# Patient Record
Sex: MALE | Race: BLACK OR AFRICAN AMERICAN | Employment: UNEMPLOYED | URBAN - METROPOLITAN AREA
[De-identification: names, ages, dates, MRNs, and addresses within clinical notes are randomized per-mention and may not be internally consistent; named-entity substitution may affect disease eponyms.]

---

## 2018-09-04 ENCOUNTER — HOSPITAL ENCOUNTER (INPATIENT)
Age: 33
LOS: 1 days | Discharge: HOME | DRG: 881 | End: 2018-09-04
Attending: EMERGENCY MEDICINE | Admitting: PSYCHIATRY & NEUROLOGY

## 2018-09-04 VITALS
WEIGHT: 170 LBS | RESPIRATION RATE: 18 BRPM | OXYGEN SATURATION: 100 % | DIASTOLIC BLOOD PRESSURE: 80 MMHG | HEART RATE: 60 BPM | HEIGHT: 68 IN | BODY MASS INDEX: 25.76 KG/M2 | SYSTOLIC BLOOD PRESSURE: 120 MMHG | TEMPERATURE: 97.8 F

## 2018-09-04 DIAGNOSIS — F32.2 CURRENT SEVERE EPISODE OF MAJOR DEPRESSIVE DISORDER WITHOUT PSYCHOTIC FEATURES WITHOUT PRIOR EPISODE (HCC): ICD-10-CM

## 2018-09-04 DIAGNOSIS — R45.851 SUICIDAL IDEATION: Primary | ICD-10-CM

## 2018-09-04 PROBLEM — F19.10 POLYSUBSTANCE ABUSE (HCC): Status: ACTIVE | Noted: 2018-09-04

## 2018-09-04 PROBLEM — F43.21 ADJUSTMENT DISORDER WITH DEPRESSED MOOD: Status: ACTIVE | Noted: 2018-09-04

## 2018-09-04 PROBLEM — Z76.5 MALINGERING: Status: ACTIVE | Noted: 2018-09-04

## 2018-09-04 LAB
AMPHET UR QL SCN: POSITIVE
ANION GAP SERPL CALC-SCNC: 4 MMOL/L (ref 5–15)
APPEARANCE UR: CLEAR
BACTERIA URNS QL MICRO: NEGATIVE /HPF
BARBITURATES UR QL SCN: NEGATIVE
BASOPHILS # BLD: 0 K/UL (ref 0–0.1)
BASOPHILS NFR BLD: 0 % (ref 0–1)
BENZODIAZ UR QL: NEGATIVE
BILIRUB UR QL CFM: NEGATIVE
BUN SERPL-MCNC: 14 MG/DL (ref 6–20)
BUN/CREAT SERPL: 11 (ref 12–20)
CALCIUM SERPL-MCNC: 8.6 MG/DL (ref 8.5–10.1)
CANNABINOIDS UR QL SCN: NEGATIVE
CHLORIDE SERPL-SCNC: 104 MMOL/L (ref 97–108)
CO2 SERPL-SCNC: 29 MMOL/L (ref 21–32)
COCAINE UR QL SCN: POSITIVE
COLOR UR: ABNORMAL
CREAT SERPL-MCNC: 1.27 MG/DL (ref 0.7–1.3)
DIFFERENTIAL METHOD BLD: NORMAL
DRUG SCRN COMMENT,DRGCM: ABNORMAL
EOSINOPHIL # BLD: 0.1 K/UL (ref 0–0.4)
EOSINOPHIL NFR BLD: 2 % (ref 0–7)
EPITH CASTS URNS QL MICRO: ABNORMAL /LPF
ERYTHROCYTE [DISTWIDTH] IN BLOOD BY AUTOMATED COUNT: 13.3 % (ref 11.5–14.5)
ETHANOL SERPL-MCNC: <10 MG/DL
GLUCOSE SERPL-MCNC: 94 MG/DL (ref 65–100)
GLUCOSE UR STRIP.AUTO-MCNC: NEGATIVE MG/DL
HCT VFR BLD AUTO: 39.8 % (ref 36.6–50.3)
HGB BLD-MCNC: 13.2 G/DL (ref 12.1–17)
HGB UR QL STRIP: NEGATIVE
IMM GRANULOCYTES # BLD: 0 K/UL (ref 0–0.04)
IMM GRANULOCYTES NFR BLD AUTO: 0 % (ref 0–0.5)
KETONES UR QL STRIP.AUTO: NEGATIVE MG/DL
LEUKOCYTE ESTERASE UR QL STRIP.AUTO: NEGATIVE
LYMPHOCYTES # BLD: 2.9 K/UL (ref 0.8–3.5)
LYMPHOCYTES NFR BLD: 43 % (ref 12–49)
MCH RBC QN AUTO: 28.5 PG (ref 26–34)
MCHC RBC AUTO-ENTMCNC: 33.2 G/DL (ref 30–36.5)
MCV RBC AUTO: 86 FL (ref 80–99)
METHADONE UR QL: NEGATIVE
MONOCYTES # BLD: 0.4 K/UL (ref 0–1)
MONOCYTES NFR BLD: 7 % (ref 5–13)
NEUTS SEG # BLD: 3.2 K/UL (ref 1.8–8)
NEUTS SEG NFR BLD: 48 % (ref 32–75)
NITRITE UR QL STRIP.AUTO: NEGATIVE
NRBC # BLD: 0 K/UL (ref 0–0.01)
NRBC BLD-RTO: 0 PER 100 WBC
OPIATES UR QL: POSITIVE
PCP UR QL: NEGATIVE
PH UR STRIP: 5.5 [PH] (ref 5–8)
PLATELET # BLD AUTO: 221 K/UL (ref 150–400)
PMV BLD AUTO: 9.7 FL (ref 8.9–12.9)
POTASSIUM SERPL-SCNC: 3.4 MMOL/L (ref 3.5–5.1)
PROT UR STRIP-MCNC: 30 MG/DL
RBC # BLD AUTO: 4.63 M/UL (ref 4.1–5.7)
RBC #/AREA URNS HPF: ABNORMAL /HPF (ref 0–5)
SODIUM SERPL-SCNC: 137 MMOL/L (ref 136–145)
SP GR UR REFRACTOMETRY: >1.03 (ref 1–1.03)
UA: UC IF INDICATED,UAUC: ABNORMAL
UROBILINOGEN UR QL STRIP.AUTO: 1 EU/DL (ref 0.2–1)
WBC # BLD AUTO: 6.7 K/UL (ref 4.1–11.1)
WBC URNS QL MICRO: ABNORMAL /HPF (ref 0–4)

## 2018-09-04 PROCEDURE — 80307 DRUG TEST PRSMV CHEM ANLYZR: CPT | Performed by: EMERGENCY MEDICINE

## 2018-09-04 PROCEDURE — 85025 COMPLETE CBC W/AUTO DIFF WBC: CPT | Performed by: EMERGENCY MEDICINE

## 2018-09-04 PROCEDURE — 80048 BASIC METABOLIC PNL TOTAL CA: CPT | Performed by: EMERGENCY MEDICINE

## 2018-09-04 PROCEDURE — 36415 COLL VENOUS BLD VENIPUNCTURE: CPT | Performed by: EMERGENCY MEDICINE

## 2018-09-04 PROCEDURE — 81001 URINALYSIS AUTO W/SCOPE: CPT | Performed by: EMERGENCY MEDICINE

## 2018-09-04 PROCEDURE — 90791 PSYCH DIAGNOSTIC EVALUATION: CPT

## 2018-09-04 PROCEDURE — 99285 EMERGENCY DEPT VISIT HI MDM: CPT

## 2018-09-04 PROCEDURE — 65220000003 HC RM SEMIPRIVATE PSYCH

## 2018-09-04 RX ORDER — BENZTROPINE MESYLATE 1 MG/ML
2 INJECTION INTRAMUSCULAR; INTRAVENOUS
Status: DISCONTINUED | OUTPATIENT
Start: 2018-09-04 | End: 2018-09-04 | Stop reason: HOSPADM

## 2018-09-04 RX ORDER — IBUPROFEN 400 MG/1
400 TABLET ORAL
Status: DISCONTINUED | OUTPATIENT
Start: 2018-09-04 | End: 2018-09-04 | Stop reason: HOSPADM

## 2018-09-04 RX ORDER — LORAZEPAM 1 MG/1
1 TABLET ORAL
Status: DISCONTINUED | OUTPATIENT
Start: 2018-09-04 | End: 2018-09-04 | Stop reason: HOSPADM

## 2018-09-04 RX ORDER — ADHESIVE BANDAGE
30 BANDAGE TOPICAL DAILY PRN
Status: DISCONTINUED | OUTPATIENT
Start: 2018-09-04 | End: 2018-09-04 | Stop reason: HOSPADM

## 2018-09-04 RX ORDER — ACETAMINOPHEN 325 MG/1
650 TABLET ORAL
Status: DISCONTINUED | OUTPATIENT
Start: 2018-09-04 | End: 2018-09-04 | Stop reason: HOSPADM

## 2018-09-04 RX ORDER — LORAZEPAM 2 MG/ML
2 INJECTION INTRAMUSCULAR
Status: DISCONTINUED | OUTPATIENT
Start: 2018-09-04 | End: 2018-09-04 | Stop reason: HOSPADM

## 2018-09-04 RX ORDER — OLANZAPINE 5 MG/1
5 TABLET ORAL
Status: DISCONTINUED | OUTPATIENT
Start: 2018-09-04 | End: 2018-09-04 | Stop reason: HOSPADM

## 2018-09-04 RX ORDER — BENZTROPINE MESYLATE 2 MG/1
2 TABLET ORAL
Status: DISCONTINUED | OUTPATIENT
Start: 2018-09-04 | End: 2018-09-04 | Stop reason: HOSPADM

## 2018-09-04 RX ORDER — IBUPROFEN 200 MG
1 TABLET ORAL
Status: DISCONTINUED | OUTPATIENT
Start: 2018-09-04 | End: 2018-09-04 | Stop reason: HOSPADM

## 2018-09-04 RX ORDER — ZOLPIDEM TARTRATE 10 MG/1
10 TABLET ORAL
Status: DISCONTINUED | OUTPATIENT
Start: 2018-09-04 | End: 2018-09-04 | Stop reason: HOSPADM

## 2018-09-04 NOTE — DISCHARGE INSTRUCTIONS
DISCHARGE SUMMARY from Nurse    PATIENT INSTRUCTIONS:    What to do at Home:    Recommended activity: Activity as tolerated,     *  Please give a list of your current medications to your Primary Care Provider. *  Please update this list whenever your medications are discontinued, doses are      changed, or new medications (including over-the-counter products) are added. *  Please carry medication information at all times in case of emergency situations. These are general instructions for a healthy lifestyle:    No smoking/ No tobacco products/ Avoid exposure to second hand smoke  Surgeon General's Warning:  Quitting smoking now greatly reduces serious risk to your health. Obesity, smoking, and sedentary lifestyle greatly increases your risk for illness    A healthy diet, regular physical exercise & weight monitoring are important for maintaining a healthy lifestyle    You may be retaining fluid if you have a history of heart failure or if you experience any of the following symptoms:  Weight gain of 3 pounds or more overnight or 5 pounds in a week, increased swelling in our hands or feet or shortness of breath while lying flat in bed. Please call your doctor as soon as you notice any of these symptoms; do not wait until your next office visit. Recognize signs and symptoms of STROKE:    F-face looks uneven    A-arms unable to move or move unevenly    S-speech slurred or non-existent    T-time-call 911 as soon as signs and symptoms begin-DO NOT go       Back to bed or wait to see if you get better-TIME IS BRAIN. Warning Signs of HEART ATTACK     Call 911 if you have these symptoms:   Chest discomfort. Most heart attacks involve discomfort in the center of the chest that lasts more than a few minutes, or that goes away and comes back. It can feel like uncomfortable pressure, squeezing, fullness, or pain.  Discomfort in other areas of the upper body.  Symptoms can include pain or discomfort in one or both arms, the back, neck, jaw, or stomach.  Shortness of breath with or without chest discomfort.  Other signs may include breaking out in a cold sweat, nausea, or lightheadedness. Don't wait more than five minutes to call 911 - MINUTES MATTER! Fast action can save your life. Calling 911 is almost always the fastest way to get lifesaving treatment. Emergency Medical Services staff can begin treatment when they arrive -- up to an hour sooner than if someone gets to the hospital by car. The discharge information has been reviewed with the patient. The patient verbalized understanding. Discharge medications reviewed with the patient and appropriate educational materials and side effects teaching were provided. If I feel that I can not keep these promises and I am at risk of hurting myself or others,I will call the crisis office and speak with a crisis worker who will assist me during my crisis.     [facility] [phone]  [facility] [phone]  Darron Clinton [phone]___________________________________________________________________________________________________________________________________

## 2018-09-04 NOTE — BH NOTES
ADMISSION NOTE: 
 
PT is a 34 yo male voluntary admitted to the services of Dr. Cynthia Zazueta. He presents with depression and SI/HI . Pt recently out of custodial, no support . He has h/o polysubstance abuse. Pt UDS+ cocaine and opiates \"BAL <10. On admission is present sleepy and slow responding to questions, barely cooperative. He denied SI/HI on admitt to the unit and contracts for safety. His skin assessment showed tattoos both arms and old scars on the left inner knee. (Fadi Michele)  Dr. Nura Henning called with report. He gave [facility] protocol with COW. Pt placed on q15 min checks for safety

## 2018-09-04 NOTE — ED NOTES
Pt presents ambulatory to ED complaining of suicidal thoughts. .Pt states I just don't want to live anymore. Pt states his finace is incarcerated and he doesn't have a support system. Pt states HI towards a family member. Pt is alert and oriented x 4, RR even and unlabored, skin is warm and dry. Assesment completed and pt updated on plan of care. Emergency Department Nursing Plan of Care The Nursing Plan of Care is developed from the Nursing assessment and Emergency Department Attending provider initial evaluation. The plan of care may be reviewed in the ED Provider note. The Plan of Care was developed with the following considerations:  
Patient / Family readiness to learn indicated by:verbalized understanding Persons(s) to be included in education: patient Barriers to Learning/Limitations:No 
 
Signed Porfirio Smith [facility]   
9/4/2018   3:41 AM

## 2018-09-04 NOTE — BH NOTES
PSYCHOSOCIAL ASSESSMENT 
:Patient identifying info: 
Marcio Paz is a 35 y.o., male admitted 9/4/2018  3:04 AM  
 
Presenting problem and precipitating factors: Pt arrived in ED with chief complaint being suicidal ideation but with no plan. However, pt stated, \"Life is better off without me, I'm better off dead. \"([facility]/Grey Ndiaye) Per [facility] note, no previous reports of suicide attempts. Pt reported he wants help but feels unstable. Pt presented as fatigued and shared he is homeless, no primary support, having financial issues and lack of primary support. Pt released from residential on first degree murder charge in June when he went to long term house but after three days he left and returned to [facility] [address]. Pt shared he is struggling with transitioning from residential. Pt reported blacking out \"sometimes\" and not knowing how he where he was and shared this has not happened in relation to any event and/or emotion Mental status assessment: Social Work-Pt refused to get out of bed thus seen in room by treatment team. Pt refused to engage after several attempts of trying to discuss treatment. Pt presents as malingering thus pt will be discharged today. Pt is alert and oriented after notified of discharge. Pt's mood once awake euthymic. Pt's thought process presented at discharge as within normal limits. Pt is to follow-up with [facility] to secure out patient psychiatry/case management/outpatient therapy if needed. Pt provided with housing crisis hotline number for shelter and [facility] information that provides felons with case management services. Pt is in agreement with the plan. [facility] care plan will be faxed electronically via [address] [facility] to [facility]#[phone]. Current psychiatric providers and contact info: Pt reported no current psychiatric providers at this time.   
 
Previous psychiatric services/providers and response to treatment: Pt reported to have been a part of [facility] in past but reported staying only through first stage before leaving program. Pt shared one psychiatric admission \"years ago\" but cannot remember as to why he was admitted. Family history of mental illness : Unknown at this time. Substance abuse history:  Pt has history of polysubstance abuse. UDS (+): Cocaine, Opiates and Amphetamines Social History Substance Use Topics  Smoking status: Current Every Day Smoker  Smokeless tobacco: Not on file  Alcohol use No  
 
 
Family constellation: Pt reported to have two children ages 3 and 13. Is significant other involved? Pt reported to be single. Describe support system: Pt reported to have no primary support system. Describe living arrangements and home environment: Pt reported to be homeless. Health issues: Please refer to H&P Hospital Problems  Never Reviewed Codes Class Noted POA * (Principal)Adjustment disorder with depressed mood ICD-10-CM: F43.21 ICD-9-CM: 309.0  2018 Yes Malingering ICD-10-CM: Z76.5 ICD-9-CM: V65.2  2018 Yes Polysubstance abuse ICD-10-CM: F19.10 ICD-9-CM: 305.90  2018 Yes Trauma history: Per [employer] report, pt has no trauma history and has not been the victim of sexual/physical abuse. Legal issues: Pt reported having no current pending legal issues but P&P status is unknown. Pt has history of being in [facility] for first degree murder and was released 2018. History of  service: Pt has no history of  service. Financial status: Pt has no means of finances. Anabaptism/cultural factors: Pt reported no Holiness/cultural factors at this time. Education/work history: Pt's highest grade achieved 6th with [employer] obtained later in years.  
 
Have you been licensed as a stephanie care professional (current or ): Pt not licensed as a health care professional. 
 
 Leisure and recreation preferences: Unknown at this time. Describe coping skills: Pt's coping skills present as ineffectual. 
 
Destinee Cornelius 9/4/2018

## 2018-09-04 NOTE — ED NOTES
TRANSFER - OUT REPORT: 
 
Verbal report given to FAYE Brown RN(name) on Chapo Edmondson  being transferred to [address] [place] [address] ([facility]) for routine progression of care Report consisted of patients Situation, Background, Assessment and  
Recommendations(SBAR). Information from the following report(s) SBAR, ED Summary and Recent Results was reviewed with the receiving nurse. Lines:    
 
Opportunity for questions and clarification was provided. Patient transported with: 
 [facility]

## 2018-09-04 NOTE — BH NOTES
Behavioral Health Transition Record to Provider Patient Name: Miladis Salinas YOB: 1985 Medical Record Number: [id number] Date of Admission: 9/4/2018 Date of Discharge: 9/4/2018 Attending Provider: Ailyn Allison MD 
Discharging Provider: Ailyn Allison MD 
To contact this individual call [phone] and ask the  to page. If unavailable, ask to be transferred to [facility] Provider on call. [facility] ER Provider will be available on call 24/7 and during holidays. Primary Care Provider: None No Known Allergies Reason for Admission: Pt admitted voluntary to ED with chief complaint being SI. Pt reporting list of psychosocial risk factors being: homelessness, polysubstance abuse, difficulty transitioning from alf, financial problems and poor primary support. Admission Diagnosis: Adjustment disorder with depressed mood * No surgery found * Results for orders placed or performed during the hospital encounter of 09/04/18 CBC WITH AUTOMATED DIFF Result Value Ref Range WBC 6.7 4.1 - 11.1 K/uL  
 RBC 4.63 4.10 - 5.70 M/uL  
 HGB 13.2 12.1 - 17.0 g/dL HCT 39.8 36.6 - 50.3 % MCV 86.0 80.0 - 99.0 FL  
 MCH 28.5 26.0 - 34.0 PG  
 MCHC 33.2 30.0 - 36.5 g/dL  
 RDW 13.3 11.5 - 14.5 % PLATELET 072 286 - 684 K/uL MPV 9.7 8.9 - 12.9 FL  
 NRBC 0.0 0  WBC ABSOLUTE NRBC 0.00 0.00 - 0.01 K/uL NEUTROPHILS 48 32 - 75 % LYMPHOCYTES 43 12 - 49 % MONOCYTES 7 5 - 13 % EOSINOPHILS 2 0 - 7 % BASOPHILS 0 0 - 1 % IMMATURE GRANULOCYTES 0 0.0 - 0.5 % ABS. NEUTROPHILS 3.2 1.8 - 8.0 K/UL  
 ABS. LYMPHOCYTES 2.9 0.8 - 3.5 K/UL  
 ABS. MONOCYTES 0.4 0.0 - 1.0 K/UL  
 ABS. EOSINOPHILS 0.1 0.0 - 0.4 K/UL  
 ABS. BASOPHILS 0.0 0.0 - 0.1 K/UL  
 ABS. IMM. GRANS. 0.0 0.00 - 0.04 K/UL  
 DF AUTOMATED METABOLIC PANEL, BASIC Result Value Ref Range Sodium 137 136 - 145 mmol/L  Potassium 3.4 (L) 3.5 - 5.1 mmol/L  
 Chloride 104 97 - 108 mmol/L  
 CO2 29 21 - 32 mmol/L Anion gap 4 (L) 5 - 15 mmol/L Glucose 94 65 - 100 mg/dL BUN 14 6 - 20 MG/DL Creatinine 1.27 0.70 - 1.30 MG/DL  
 BUN/Creatinine ratio 11 (L) 12 - 20 GFR est AA >60 >60 ml/min/1.73m2 GFR est non-AA >60 >60 ml/min/1.73m2 Calcium 8.6 8.5 - 10.1 MG/DL URINALYSIS W/ REFLEX CULTURE Result Value Ref Range Color DARK YELLOW Appearance CLEAR CLEAR Specific gravity >1.030 (H) 1.003 - 1.030  
 pH (UA) 5.5 5.0 - 8.0 Protein 30 (A) NEG mg/dL Glucose NEGATIVE  NEG mg/dL Ketone NEGATIVE  NEG mg/dL Blood NEGATIVE  NEG Urobilinogen 1.0 0.2 - 1.0 EU/dL Nitrites NEGATIVE  NEG Leukocyte Esterase NEGATIVE  NEG    
 WBC 0-4 0 - 4 /hpf  
 RBC 0-5 0 - 5 /hpf Epithelial cells FEW FEW /lpf Bacteria NEGATIVE  NEG /hpf  
 UA:UC IF INDICATED CULTURE NOT INDICATED BY UA RESULT CNI    
DRUG SCREEN, URINE Result Value Ref Range AMPHETAMINES POSITIVE (A) NEG    
 BARBITURATES NEGATIVE  NEG BENZODIAZEPINES NEGATIVE  NEG    
 COCAINE POSITIVE (A) NEG METHADONE NEGATIVE  NEG    
 OPIATES POSITIVE (A) NEG    
 PCP(PHENCYCLIDINE) NEGATIVE  NEG    
 THC (TH-CANNABINOL) NEGATIVE  NEG Drug screen comment (NOTE) ETHYL ALCOHOL Result Value Ref Range ALCOHOL(ETHYL),SERUM <10 <10 MG/DL  
BILIRUBIN, CONFIRM Result Value Ref Range Bilirubin UA, confirm NEGATIVE  NEG Immunizations administered during this encounter: There is no immunization history on file for this patient. Screening for Metabolic Disorders for Patients on Antipsychotic Medications 
(Data obtained from the EMR) Estimated Body Mass Index Estimated body mass index is 25.85 kg/(m^2) as calculated from the following: 
  Height as of this encounter: 5' 8\" (1.727 m). Weight as of this encounter: 77.1 kg (170 lb). Vital Signs/Blood Pressure Visit Vitals  /80  Pulse 60  Temp 97.8 °F (36.6 °C)  Resp 18  Ht 5' 8\" (1.727 m)  Wt 77.1 kg (170 lb)  SpO2 100%  BMI 25.85 kg/m2 Blood Glucose/Hemoglobin A1c Lab Results Component Value Date/Time Glucose 94 09/04/2018 03:30 AM  
 
 
No results found for: HBA1C, HGBE8, MLE4VJLL Lipid Panel No results found for: CHOL, CHOLX, CHLST, [address], [id number], HDL, LDL, LDLC, DLDLP, TGLX, TRIGL, TRIGP, CHHD, CHHDX Discharge Diagnosis: Please refer to psychiatrist's note. Discharge Plan: Pt seen in room in which he refused to engage in treatment with psychiatrist. Pt presents as malingering and choosing not to participate in his treatment. Pt provided bus ticket for transportation. Pt to be discharged with follow-up to [facility] for outpatient case management/SA services, [facility] referral for case management (housing, support, work, etc.), [address] provided to pt and [facility] NA meeting list for recommended sobriety. Pt is in agreement with the plan. [facility] care plan will be faxed electronically via [address] [facility] to [facility]#[phone] Discharge Medication List and Instructions: There are no discharge medications for this patient. Unresulted Labs None To obtain results of studies pending at discharge, please contact [phone] Follow-up Information Follow up With Details Comments Contact Info None   None ([phone]) Patient stated that they have no PCP Advanced Directive:  
Does the patient have an appointed surrogate decision maker? No 
Does the patient have a Medical Advance Directive? No 
Does the patient have a Psychiatric Advance Directive? No 
If the patient does not have a surrogate or Medical Advance Directive AND Psychiatric Advance Directive, the patient was offered information on these advance directives Patient will complete at a later time Patient Instructions: Please continue all medications until otherwise directed by physician. Tobacco Cessation Discharge Plan:  
Is the patient a smoker and needs referral for smoking cessation? Not applicable Patient referred to the following for smoking cessation with an appointment? Not applicable Patient was offered medication to assist with smoking cessation at discharge? Not applicable Was education for smoking cessation added to the discharge instructions? Not applicable Alcohol/Substance Abuse Discharge Plan:  
Does the patient have a history of substance/alcohol abuse and requires a referral for treatment? Yes Patient referred to the following for substance/alcohol abuse treatment with an appointment? No 
Patient was offered medication to assist with alcohol cessation at discharge? No 
Was education for substance/alcohol abuse added to discharge instructions? No 
 
Patient discharged to Home; provided to the patient/caregiver either in hard copy or electronically.

## 2018-09-04 NOTE — IP AVS SNAPSHOT
[facility] 
 
 
 [address] Patient: Thea Molina MRN: [id number] EET:5/15/7075 A check yamilet indicates which time of day the medication should be taken. My Medications Notice You have not been prescribed any medications.

## 2018-09-04 NOTE — ED PROVIDER NOTES
Patient is a 35 y.o. male presenting with mental health disorder. The history is provided by the patient. Mental Health Problem This is a new problem. Associated symptoms include agitation. Pertinent negatives include no seizures and no numbness. Mental status baseline is normal.  Risk factors include alcohol intake and the patient not taking meds correctly (recent release from [facility]). No past medical history on file. No past surgical history on file. No family history on file. Social History Social History  Marital status: N/A Spouse name: N/A  
 Number of children: N/A  
 Years of education: N/A Occupational History  Not on file. Social History Main Topics  Smoking status: Not on file  Smokeless tobacco: Not on file  Alcohol use Not on file  Drug use: Not on file  Sexual activity: Not on file Other Topics Concern  Not on file Social History Narrative ALLERGIES: Review of patient's allergies indicates no known allergies. Review of Systems Constitutional: Negative for activity change, appetite change and fever. HENT: Negative for congestion. Eyes: Negative for pain. Respiratory: Negative for apnea, cough, shortness of breath and wheezing. Cardiovascular: Negative for chest pain. Endocrine: Negative for polyuria. Genitourinary: Negative for dysuria, frequency, hematuria, penile pain and penile swelling. Allergic/Immunologic: Negative for food allergies and immunocompromised state. Neurological: Negative for dizziness, seizures, syncope, facial asymmetry, speech difficulty, light-headedness, numbness and headaches. Psychiatric/Behavioral: Positive for agitation. Vitals:  
 09/04/18 0316 BP: 120/78 Pulse: 71 Resp: 18 Temp: 97.8 °F (36.6 °C) SpO2: 100% Weight: 77.1 kg (170 lb) Height: 5' 8\" (1.727 m) Physical Exam  
Constitutional: He is oriented to person, place, and time.  He appears well-developed and well-nourished. Cardiovascular: Normal rate and regular rhythm. Exam reveals no gallop and no friction rub. No murmur heard. Abdominal: Soft. Bowel sounds are normal. He exhibits no distension. There is no tenderness. There is no rebound and no guarding. Musculoskeletal: He exhibits no edema, tenderness or deformity. Neurological: He is alert and oriented to person, place, and time. He has normal reflexes. No cranial nerve deficit. Coordination normal.  
Skin: Skin is warm and dry. Psychiatric: He has a normal mood and affect. Nursing note and vitals reviewed. [facility] 
 
 
ED Course Procedures

## 2018-09-04 NOTE — BH NOTES
Pt being discharged per Dr Glynn Lindsey. Security here to assist and accompany pt to the bus stop. Valuables/Belongings returned.

## 2018-09-04 NOTE — IP AVS SNAPSHOT
Summary of Care Report The Summary of Care report has been created to help improve care coordination. Users with access to [employer] or [employer] [address] (Web-based application) may access additional patient information including the Discharge Summary. If you are not currently a [address] user and need more information, please call the number listed below in the [place] [address] section and ask to be connected with Medical Records. Facility Information Name Address Phone [facility] [address] [place] [phone] Patient Information Patient Name Sex  Cletis Dose ([id number]) Male 1985 Discharge Information Admitting Provider Service Area Unit Whitney Moss MD / [address] [place] [address] [address] Acute Behav [facility] / [phone] Discharge Provider Discharge Date/Time Discharge Disposition Destination (none) 2018 Morning (Pending) AHR (none) Patient Language Language ENGLISH [13] Hospital Problems as of 2018  Never Reviewed Class Noted - Resolved Last Modified POA Active Problems * (Principal)Adjustment disorder with depressed mood  2018 - Present 2018 by Cornelia Garcia MD Yes Entered by Whitney Moss MD  
  Malingering  2018 - Present 2018 by Cornelia Garcia MD Yes Entered by Cornelia Garcia MD  
  Polysubstance abuse  2018 - Present 2018 by Cornelia Garcia MD Yes Entered by Cornelia Garcia MD  
  
You are allergic to the following No active allergies Current Discharge Medication List  
  
Notice You have not been prescribed any medications. Follow-up Information Follow up With Details Comments Contact Info  None   None ([phone]) Patient stated that they have no PCP 
  
 [facility] [address] On 9/4/2018 Follow up to open access (M-F 7:30-11 & 12-4:00pm) on 9/4/2018 for case management/outpatient therapy services. *bring id, insurance card and medications. [facility]  Follow up with [facility] for case management services. [facility], [facility] 
[phone] [facility] Intake Line  Follow up with [address] [place] [address] [phone] to establish shelter. *provided with telephone number and process [phone] [facility] NA meeting list On 9/4/2018 Follow up with NA meeting list regarding sobriety. *pt provided with latest JUDD howard *A NA 8/2018 meeting list  
  
 
Discharge Instructions DISCHARGE SUMMARY from Nurse PATIENT INSTRUCTIONS: 
 
What to do at Home: 
 
Recommended activity: Activity as tolerated, *  Please give a list of your current medications to your Primary Care Provider. *  Please update this list whenever your medications are discontinued, doses are 
    changed, or new medications (including over-the-counter products) are added. *  Please carry medication information at all times in case of emergency situations. These are general instructions for a healthy lifestyle: No smoking/ No tobacco products/ Avoid exposure to second hand smoke Surgeon General's Warning:  Quitting smoking now greatly reduces serious risk to your health. Obesity, smoking, and sedentary lifestyle greatly increases your risk for illness A healthy diet, regular physical exercise & weight monitoring are important for maintaining a healthy lifestyle You may be retaining fluid if you have a history of heart failure or if you experience any of the following symptoms:  Weight gain of 3 pounds or more overnight or 5 pounds in a week, increased swelling in our hands or feet or shortness of breath while lying flat in bed.   Please call your doctor as soon as you notice any of these symptoms; do not wait until your next office visit. Recognize signs and symptoms of STROKE: 
 
F-face looks uneven A-arms unable to move or move unevenly S-speech slurred or non-existent T-time-call 911 as soon as signs and symptoms begin-DO NOT go Back to bed or wait to see if you get better-TIME IS BRAIN. Warning Signs of HEART ATTACK Call 911 if you have these symptoms: 
? Chest discomfort. Most heart attacks involve discomfort in the center of the chest that lasts more than a few minutes, or that goes away and comes back. It can feel like uncomfortable pressure, squeezing, fullness, or pain. ? Discomfort in other areas of the upper body. Symptoms can include pain or discomfort in one or both arms, the back, neck, jaw, or stomach. ? Shortness of breath with or without chest discomfort. ? Other signs may include breaking out in a cold sweat, nausea, or lightheadedness. Don't wait more than five minutes to call [address]! Fast action can save your life. Calling 911 is almost always the fastest way to get lifesaving treatment. Emergency Medical Services staff can begin treatment when they arrive  up to an hour sooner than if someone gets to the hospital by car. The discharge information has been reviewed with the patient. The patient verbalized understanding. Discharge medications reviewed with the patient and appropriate educational materials and side effects teaching were provided. If I feel that I can not keep these promises and I am at risk of hurting myself or others,I will call the crisis office and speak with a crisis worker who will assist me during my crisis. [address] [place] [address] [phone] [address] [place] [address] [phone] [facility] Yoel Diehl Crisis [phone] [facility] Crisis [phone]___________________________________________________________________ ________________________________________________________________ Chart Review Routing History No Routing History on File

## 2018-09-05 NOTE — H&P
INITIAL PSYCHIATRIC EVALUATION  AND DISCHARGE SUMMARY IDENTIFICATION:   
Patient Name  Elvin Brumfield Date of Birth 1985 [facility] [id number] Medical Record Number  [id number] Age  35 y.o. PCP None Admit date:  9/4/2018 Room Number  312/35  @ [facility]  
Date of Service  9/4/2018 HISTORY  
 
   
REASON FOR HOSPITALIZATION: 
CC: Refused to respond to questions. Pt admitted on a voluntary basis for depression and vague SI  
HISTORY OF PRESENT ILLNESS:   
The patient, Elvin Brumfield, is a 35 y.o. BLACK OR  male without a past psychiatric history. He  presents at this time with complaints of (and/or evidence of) the following emotional symptoms: depression. The patient reports recent release from snf, lack of housing and relapse to using heroin and cocaine. He reported feelings of depression and suicidal thoughts in the ED. ALLERGIES: No Known Allergies MEDICATIONS PRIOR TO ADMISSION:  
No prescriptions prior to admission. PAST MEDICAL HISTORY:  
History reviewed. No pertinent past medical history. History reviewed. No pertinent surgical history. SOCIAL HISTORY: homeless; unemployed Social History Social History  Marital status: SINGLE Spouse name: N/A  
 Number of children: N/A  
 Years of education: N/A Occupational History  Not on file. Social History Main Topics  Smoking status: Current Every Day Smoker  Smokeless tobacco: Not on file  Alcohol use No  
 Drug use: Yes Special: Cocaine, Heroin  Sexual activity: Not on file Other Topics Concern  Not on file Social History Narrative  No narrative on file FAMILY HISTORY: unknown History reviewed. No pertinent family history. REVIEW OF SYSTEMS:  
Negative except depressed mood, obstinance, negativisim Pertinent items are noted in the History of Present Illness. All other Systems reviewed and are considered negative. [facility] MENTAL STATUS EXAM (MSE): MSE FINDINGS ARE WITHIN NORMAL LIMITS (WNL) UNLESS OTHERWISE STATED BELOW. ( ALL OF THE BELOW CATEGORIES OF THE MSE HAVE BEEN REVIEWED (reviewed 9/4/2018) AND UPDATED AS DEEMED APPROPRIATE ) General Presentation age appropriate and casually dressed, uncooperative Orientation Unable to assess Vital Signs  See below (reviewed 9/4/2018); Vital Signs (BP, Pulse, & Temp) are within normal limits if not listed below. Gait and Station Stable/steady, no ataxia Musculoskeletal System No extrapyramidal symptoms (EPS); no abnormal muscular movements or Tardive Dyskinesia (TD); muscle strength and tone are within normal limits Language No aphasia or dysarthria Speech:  refused to speak or respond to questions. Thought Processes (+)Illlogical; slow rate of thoughts; fair abstract reasoning/computation Thought Associations unable to assess, but nursing noted stable thought processes. Thought Content paranoid delusions, Mandaen delusions and internally preoccupied Suicidal Ideations none Homicidal Ideations none Mood:  labile Affect:  constricted, odd demeanor Memory recent  unable to assess Memory remote:  unable to assess Concentration/Attention:  poor Fund of Knowledge avg. Insight:  limited Reliability poor Judgment:  limited VITALS:    
Patient Vitals for the past 24 hrs: 
 Temp Pulse Resp BP SpO2  
09/04/18 0607 - - 18 120/80 -  
09/04/18 0600 97.8 °F (36.6 °C) 60 18 120/80 -  
09/04/18 0525 97.8 °F (36.6 °C) 67 16 105/73 100 % 09/04/18 0316 97.8 °F (36.6 °C) 71 18 120/78 100 % Wt Readings from Last 3 Encounters:  
09/04/18 77.1 kg (170 lb) Temp Readings from Last 3 Encounters:  
09/04/18 97.8 °F (36.6 °C) BP Readings from Last 3 Encounters:  
09/04/18 120/80 Pulse Readings from Last 3 Encounters:  
09/04/18 60 DATA LABORATORY DATA: 
Labs Reviewed METABOLIC PANEL, BASIC - Abnormal; Notable for the following:   
    Result Value Potassium 3.4 (*) Anion gap 4 (*)   
 BUN/Creatinine ratio 11 (*) All other components within normal limits URINALYSIS W/ REFLEX CULTURE - Abnormal; Notable for the following:   
 Specific gravity >1.030 (*) Protein 30 (*) All other components within normal limits DRUG SCREEN, URINE - Abnormal; Notable for the following:   
 AMPHETAMINES POSITIVE (*) COCAINE POSITIVE (*) OPIATES POSITIVE (*) All other components within normal limits CBC WITH AUTOMATED DIFF  
ETHYL ALCOHOL  
BILIRUBIN, CONFIRM Admission on 09/04/2018, Discharged on 09/04/2018 Component Date Value Ref Range Status  WBC 09/04/2018 6.7  4.1 - 11.1 K/uL Final  
 RBC 09/04/2018 4.63  4.10 - 5.70 M/uL Final  
 HGB 09/04/2018 13.2  12.1 - 17.0 g/dL Final  
 HCT 09/04/2018 39.8  36.6 - 50.3 % Final  
 MCV 09/04/2018 86.0  80.0 - 99.0 FL Final  
 MCH 09/04/2018 28.5  26.0 - 34.0 PG Final  
 MCHC 09/04/2018 33.2  30.0 - 36.5 g/dL Final  
 RDW 09/04/2018 13.3  11.5 - 14.5 % Final  
 PLATELET 58/84/4019 335  150 - 400 K/uL Final  
 MPV 09/04/2018 9.7  8.9 - 12.9 FL Final  
 NRBC 09/04/2018 0.0  0  WBC Final  
 ABSOLUTE NRBC 09/04/2018 0.00  0.00 - 0.01 K/uL Final  
 NEUTROPHILS 09/04/2018 48  32 - 75 % Final  
 LYMPHOCYTES 09/04/2018 43  12 - 49 % Final  
 MONOCYTES 09/04/2018 7  5 - 13 % Final  
 EOSINOPHILS 09/04/2018 2  0 - 7 % Final  
 BASOPHILS 09/04/2018 0  0 - 1 % Final  
 IMMATURE GRANULOCYTES 09/04/2018 0  0.0 - 0.5 % Final  
 ABS. NEUTROPHILS 09/04/2018 3.2  1.8 - 8.0 K/UL Final  
 ABS. LYMPHOCYTES 09/04/2018 2.9  0.8 - 3.5 K/UL Final  
 ABS. MONOCYTES 09/04/2018 0.4  0.0 - 1.0 K/UL Final  
 ABS. EOSINOPHILS 09/04/2018 0.1  0.0 - 0.4 K/UL Final  
 ABS. BASOPHILS 09/04/2018 0.0  0.0 - 0.1 K/UL Final  
 ABS. IMM.  GRANS. 09/04/2018 0.0  0.00 - 0.04 K/UL Final  
 DF 09/04/2018 AUTOMATED    Final  
  Sodium 09/04/2018 137  136 - 145 mmol/L Final  
 Potassium 09/04/2018 3.4* 3.5 - 5.1 mmol/L Final  
 Chloride 09/04/2018 104  97 - 108 mmol/L Final  
 CO2 09/04/2018 29  21 - 32 mmol/L Final  
 Anion gap 09/04/2018 4* 5 - 15 mmol/L Final  
 Glucose 09/04/2018 94  65 - 100 mg/dL Final  
 BUN 09/04/2018 14  6 - 20 MG/DL Final  
 Creatinine 09/04/2018 1.27  0.70 - 1.30 MG/DL Final  
 BUN/Creatinine ratio 09/04/2018 11* 12 - 20   Final  
 GFR est AA 09/04/2018 >60  >60 ml/min/1.73m2 Final  
 GFR est non-AA 09/04/2018 >60  >60 ml/min/1.73m2 Final  
 Calcium 09/04/2018 8.6  8.5 - 10.1 MG/DL Final  
 Color 09/04/2018 DARK YELLOW    Final  
 Appearance 09/04/2018 CLEAR  CLEAR   Final  
 Specific gravity 09/04/2018 >1.030* 1.003 - 1.030 Final  
 pH (UA) 09/04/2018 5.5  5.0 - 8.0   Final  
 Protein 09/04/2018 30* NEG mg/dL Final  
 Glucose 09/04/2018 NEGATIVE   NEG mg/dL Final  
 Ketone 09/04/2018 NEGATIVE   NEG mg/dL Final  
 Blood 09/04/2018 NEGATIVE   NEG   Final  
 Urobilinogen 09/04/2018 1.0  0.2 - 1.0 EU/dL Final  
 Nitrites 09/04/2018 NEGATIVE   NEG   Final  
 Leukocyte Esterase 09/04/2018 NEGATIVE   NEG   Final  
 WBC 09/04/2018 0-4  0 - 4 /hpf Final  
 RBC 09/04/2018 0-5  0 - 5 /hpf Final  
 Epithelial cells 09/04/2018 FEW  FEW /lpf Final  
 Bacteria 09/04/2018 NEGATIVE   NEG /hpf Final  
 UA:UC IF INDICATED 09/04/2018 CULTURE NOT INDICATED BY UA RESULT  CNI   Final  
 AMPHETAMINES 09/04/2018 POSITIVE* NEG   Final  
 BARBITURATES 09/04/2018 NEGATIVE   NEG   Final  
 BENZODIAZEPINES 09/04/2018 NEGATIVE   NEG   Final  
 COCAINE 09/04/2018 POSITIVE* NEG   Final  
 METHADONE 09/04/2018 NEGATIVE   NEG   Final  
 OPIATES 09/04/2018 POSITIVE* NEG   Final  
 PCP(PHENCYCLIDINE) 09/04/2018 NEGATIVE   NEG   Final  
 THC (TH-CANNABINOL) 09/04/2018 NEGATIVE   NEG   Final  
 Drug screen comment 09/04/2018 (NOTE)   Final  
 ALCOHOL(ETHYL),SERUM 09/04/2018 <10  <10 MG/DL Final  
  Bilirubin UA, confirm 09/04/2018 NEGATIVE   NEG   Final  
 
  
RADIOLOGY REPORTS: 
No results found for this or any previous visit. No results found. MEDICATIONS ALL MEDICATIONS No current facility-administered medications for this encounter. No current outpatient prescriptions on file. SCHEDULED MEDICATIONS No current facility-administered medications for this encounter. ASSESSMENT & PLAN The patient, Rajiv Templeton, is a 35 y.o.  male who presents at this time for treatment of the following diagnoses: 
Patient Active Hospital Problem List: 
 Adjustment disorder with depressed mood (9/4/2018) Assessment: mood sx correlate with situatational stress. Patient's unwillingness to engage with the team or individual staff in a low stress situation. Clear obstinance, without depressive or psychotic sx noted. Plan:  
Agree with inpatient hospitalization for further evaluation, safety monitoring and medication management, but patient volitionally not engaging with team, oppositional and clearly enjoying secondary gain from the hospitalization. Medications- continue seroquel Discharge with crisis numbers and community service board numbers. Malingering (9/4/2018) Assessment: chronic Plan- brief hospital 
 
 Polysubstance abuse (9/4/2018) Assessment: modrerate to severe Plan: rbha I will continue to monitor blood levels (Depakote, Tegretol, lithium, clozapine---a drug with a narrow therapeutic index= NTI) and associated labs for drug therapy implemented that require intense monitoring for toxicity as deemed appropriate based on current medication side effects and pharmacodynamically determined drug 1/2 lives. A coordinated, multidisplinary treatment team (includes the nurse, unit pharmcist,  and writer) round was conducted for this initial evaluation with the patient present. The following regarding medications was addressed during rounds with patient:  
the risks and benefits of the proposed medication. The patient was given the opportunity to ask questions. Informed consent given to the use of the above medications. I will continue to adjust psychiatric and non-psychiatric medications (see above \"medication\" section and orders section for details) as deemed appropriate & based upon diagnoses and response to treatment. I have reviewed admission (and previous/old) labs and medical tests in the EHR and or transferring hospital documents. I will continue to order blood tests/labs and diagnostic tests as deemed appropriate and review results as they become available (see orders for details). I have reviewed old psychiatric and medical records available in the EHR. I Will order additional psychiatric records from other institutions to further elucidate the nature of patient's psychopathology and review once available. I will gather additional collateral information from friends, family and o/p treatment team to further elucidate the nature of patient's psychopathology and baselline level of psychiatric functioning. ESTIMATED LENGTH OF STAY:   
3 days STRENGTHS: 
Knowledge of medications and Motivated and ready for change SIGNED:   
Eboni Pearl MD 
9/4/2018

## 2018-09-09 NOTE — DISCHARGE SUMMARY
INITIAL PSYCHIATRIC EVALUATION  AND DISCHARGE SUMMARY           IDENTIFICATION:    Patient Name  Eunice Page   Date of Birth 1985   [facility] [id number]   Medical Record Number  [id number]      Age  35 y.o. PCP None   Admit date:  9/4/2018    Room Number  866/58  @ [address]   Date of Service  9/9/2018            HISTORY         REASON FOR HOSPITALIZATION:  CC: Refused to respond to questions. Pt admitted on a voluntary basis for depression and vague SI   HISTORY OF PRESENT ILLNESS:    The patient, Eunice Page, is a 35 y.o. BLACK OR  male without a past psychiatric history. He  presents at this time with complaints of (and/or evidence of) the following emotional symptoms: depression. The patient reports recent release from snf, lack of housing and relapse to using heroin and cocaine. He reported feelings of depression and suicidal thoughts in the ED. ALLERGIES: No Known Allergies   MEDICATIONS PRIOR TO ADMISSION:   No prescriptions prior to admission. PAST MEDICAL HISTORY:   History reviewed. No pertinent past medical history. History reviewed. No pertinent surgical history. SOCIAL HISTORY: homeless; unemployed  Social History     Social History    Marital status: SINGLE     Spouse name: N/A    Number of children: N/A    Years of education: N/A     Occupational History    Not on file. Social History Main Topics    Smoking status: Current Every Day Smoker    Smokeless tobacco: Not on file    Alcohol use No    Drug use: Yes     Special: Cocaine, Heroin    Sexual activity: Not on file     Other Topics Concern    Not on file     Social History Narrative    No narrative on file      FAMILY HISTORY: unknown  History reviewed. No pertinent family history. REVIEW OF SYSTEMS:   Negative except depressed mood, obstinance, negativisim  Pertinent items are noted in the History of Present Illness. All other Systems reviewed and are considered negative. MENTAL STATUS EXAM & VITALS     MENTAL STATUS EXAM (MSE):    MSE FINDINGS ARE WITHIN NORMAL LIMITS (WNL) UNLESS OTHERWISE STATED BELOW. ( ALL OF THE BELOW CATEGORIES OF THE MSE HAVE BEEN REVIEWED (reviewed 9/9/2018) AND UPDATED AS DEEMED APPROPRIATE )  General Presentation age appropriate and casually dressed, uncooperative   Orientation Unable to assess   Vital Signs  See below (reviewed 9/9/2018); Vital Signs (BP, Pulse, & Temp) are within normal limits if not listed below. Gait and Station Stable/steady, no ataxia   Musculoskeletal System No extrapyramidal symptoms (EPS); no abnormal muscular movements or Tardive Dyskinesia (TD); muscle strength and tone are within normal limits   Language No aphasia or dysarthria   Speech:  refused to speak or respond to questions. Thought Processes (+)Illlogical; slow rate of thoughts; fair abstract reasoning/computation   Thought Associations unable to assess, but nursing noted stable thought processes. Thought Content paranoid delusions, Yazdanism delusions and internally preoccupied   Suicidal Ideations none   Homicidal Ideations none   Mood:  labile    Affect:  constricted, odd demeanor    Memory recent  unable to assess   Memory remote:  unable to assess   Concentration/Attention:  poor   Fund of Knowledge avg. Insight:  limited   Reliability poor   Judgment:  limited          VITALS:     No data found.     Wt Readings from Last 3 Encounters:   09/04/18 77.1 kg (170 lb)     Temp Readings from Last 3 Encounters:   09/04/18 97.8 °F (36.6 °C)     BP Readings from Last 3 Encounters:   09/04/18 120/80     Pulse Readings from Last 3 Encounters:   09/04/18 60            DATA     LABORATORY DATA:  Labs Reviewed   METABOLIC PANEL, BASIC - Abnormal; Notable for the following:        Result Value    Potassium 3.4 (*)     Anion gap 4 (*)     BUN/Creatinine ratio 11 (*)     All other components within normal limits   URINALYSIS W/ REFLEX CULTURE - Abnormal; Notable for the following:     Specific gravity >1.030 (*)     Protein 30 (*)     All other components within normal limits   DRUG SCREEN, URINE - Abnormal; Notable for the following:     AMPHETAMINES POSITIVE (*)     COCAINE POSITIVE (*)     OPIATES POSITIVE (*)     All other components within normal limits   CBC WITH AUTOMATED DIFF   ETHYL ALCOHOL   BILIRUBIN, CONFIRM     Admission on 09/04/2018, Discharged on 09/04/2018   Component Date Value Ref Range Status    WBC 09/04/2018 6.7  4.1 - 11.1 K/uL Final    RBC 09/04/2018 4.63  4.10 - 5.70 M/uL Final    HGB 09/04/2018 13.2  12.1 - 17.0 g/dL Final    HCT 09/04/2018 39.8  36.6 - 50.3 % Final    MCV 09/04/2018 86.0  80.0 - 99.0 FL Final    MCH 09/04/2018 28.5  26.0 - 34.0 PG Final    MCHC 09/04/2018 33.2  30.0 - 36.5 g/dL Final    RDW 09/04/2018 13.3  11.5 - 14.5 % Final    PLATELET 84/29/1063 532  150 - 400 K/uL Final    MPV 09/04/2018 9.7  8.9 - 12.9 FL Final    NRBC 09/04/2018 0.0  0  WBC Final    ABSOLUTE NRBC 09/04/2018 0.00  0.00 - 0.01 K/uL Final    NEUTROPHILS 09/04/2018 48  32 - 75 % Final    LYMPHOCYTES 09/04/2018 43  12 - 49 % Final    MONOCYTES 09/04/2018 7  5 - 13 % Final    EOSINOPHILS 09/04/2018 2  0 - 7 % Final    BASOPHILS 09/04/2018 0  0 - 1 % Final    IMMATURE GRANULOCYTES 09/04/2018 0  0.0 - 0.5 % Final    ABS. NEUTROPHILS 09/04/2018 3.2  1.8 - 8.0 K/UL Final    ABS. LYMPHOCYTES 09/04/2018 2.9  0.8 - 3.5 K/UL Final    ABS. MONOCYTES 09/04/2018 0.4  0.0 - 1.0 K/UL Final    ABS. EOSINOPHILS 09/04/2018 0.1  0.0 - 0.4 K/UL Final    ABS. BASOPHILS 09/04/2018 0.0  0.0 - 0.1 K/UL Final    ABS. IMM.  GRANS. 09/04/2018 0.0  0.00 - 0.04 K/UL Final    DF 09/04/2018 AUTOMATED    Final    Sodium 09/04/2018 137  136 - 145 mmol/L Final    Potassium 09/04/2018 3.4* 3.5 - 5.1 mmol/L Final    Chloride 09/04/2018 104  97 - 108 mmol/L Final    CO2 09/04/2018 29  21 - 32 mmol/L Final    Anion gap 09/04/2018 4* 5 - 15 mmol/L Final    Glucose 09/04/2018 94  65 - 100 mg/dL Final    BUN 09/04/2018 14  6 - 20 MG/DL Final    Creatinine 09/04/2018 1.27  0.70 - 1.30 MG/DL Final    BUN/Creatinine ratio 09/04/2018 11* 12 - 20   Final    GFR est AA 09/04/2018 >60  >60 ml/min/1.73m2 Final    GFR est non-AA 09/04/2018 >60  >60 ml/min/1.73m2 Final    Calcium 09/04/2018 8.6  8.5 - 10.1 MG/DL Final    Color 09/04/2018 DARK YELLOW    Final    Appearance 09/04/2018 CLEAR  CLEAR   Final    Specific gravity 09/04/2018 >1.030* 1.003 - 1.030 Final    pH (UA) 09/04/2018 5.5  5.0 - 8.0   Final    Protein 09/04/2018 30* NEG mg/dL Final    Glucose 09/04/2018 NEGATIVE   NEG mg/dL Final    Ketone 09/04/2018 NEGATIVE   NEG mg/dL Final    Blood 09/04/2018 NEGATIVE   NEG   Final    Urobilinogen 09/04/2018 1.0  0.2 - 1.0 EU/dL Final    Nitrites 09/04/2018 NEGATIVE   NEG   Final    Leukocyte Esterase 09/04/2018 NEGATIVE   NEG   Final    WBC 09/04/2018 0-4  0 - 4 /hpf Final    RBC 09/04/2018 0-5  0 - 5 /hpf Final    Epithelial cells 09/04/2018 FEW  FEW /lpf Final    Bacteria 09/04/2018 NEGATIVE   NEG /hpf Final    UA:UC IF INDICATED 09/04/2018 CULTURE NOT INDICATED BY UA RESULT  CNI   Final    AMPHETAMINES 09/04/2018 POSITIVE* NEG   Final    BARBITURATES 09/04/2018 NEGATIVE   NEG   Final    BENZODIAZEPINES 09/04/2018 NEGATIVE   NEG   Final    COCAINE 09/04/2018 POSITIVE* NEG   Final    METHADONE 09/04/2018 NEGATIVE   NEG   Final    OPIATES 09/04/2018 POSITIVE* NEG   Final    PCP(PHENCYCLIDINE) 09/04/2018 NEGATIVE   NEG   Final    THC (TH-CANNABINOL) 09/04/2018 NEGATIVE   NEG   Final    Drug screen comment 09/04/2018 (NOTE)   Final    ALCOHOL(ETHYL),SERUM 09/04/2018 <10  <10 MG/DL Final    Bilirubin UA, confirm 09/04/2018 NEGATIVE   NEG   Final        RADIOLOGY REPORTS:  No results found for this or any previous visit. No results found. MEDICATIONS       ALL MEDICATIONS  No current facility-administered medications for this encounter.       No current outpatient prescriptions on file. SCHEDULED MEDICATIONS  No current facility-administered medications for this encounter. ASSESSMENT & PLAN        The patient, Joy Lewis, is a 35 y.o.  male who presents at this time for treatment of the following diagnoses:  Patient Active Hospital Problem List:   Adjustment disorder with depressed mood (9/4/2018)    Assessment: mood sx correlate with situatational stress. Patient's unwillingness to engage with the team or individual staff in a low stress situation. Clear obstinance, without depressive or psychotic sx noted. Plan:   Agree with inpatient hospitalization for further evaluation, safety monitoring and medication management, but patient volitionally not engaging with team, oppositional and clearly enjoying secondary gain from the hospitalization. Medications- continue seroquel  Discharge with crisis numbers and community service board numbers. Malingering (9/4/2018)    Assessment: chronic    Plan- brief hospital     Polysubstance abuse (9/4/2018)    Assessment: modrerate to severe    Plan: rbha          I will continue to monitor blood levels (Depakote, Tegretol, lithium, clozapine---a drug with a narrow therapeutic index= NTI) and associated labs for drug therapy implemented that require intense monitoring for toxicity as deemed appropriate based on current medication side effects and pharmacodynamically determined drug 1/2 lives. A coordinated, multidisplinary treatment team (includes the nurse, unit pharmcist,  and writer) round was conducted for this initial evaluation with the patient present. The following regarding medications was addressed during rounds with patient:   the risks and benefits of the proposed medication. The patient was given the opportunity to ask questions. Informed consent given to the use of the above medications.      I will continue to adjust psychiatric and non-psychiatric medications (see above \"medication\" section and orders section for details) as deemed appropriate & based upon diagnoses and response to treatment. I have reviewed admission (and previous/old) labs and medical tests in the EHR and or transferring hospital documents. I will continue to order blood tests/labs and diagnostic tests as deemed appropriate and review results as they become available (see orders for details). I have reviewed old psychiatric and medical records available in the EHR. I Will order additional psychiatric records from other institutions to further elucidate the nature of patient's psychopathology and review once available. I will gather additional collateral information from friends, family and o/p treatment team to further elucidate the nature of patient's psychopathology and baselline level of psychiatric functioning.       ESTIMATED LENGTH OF STAY:    3 days       STRENGTHS:  Knowledge of medications and Motivated and ready for change                                        SIGNED:    Kameron Noble MD  9/9/2018